# Patient Record
Sex: MALE | Race: OTHER | HISPANIC OR LATINO | Employment: FULL TIME | URBAN - METROPOLITAN AREA
[De-identification: names, ages, dates, MRNs, and addresses within clinical notes are randomized per-mention and may not be internally consistent; named-entity substitution may affect disease eponyms.]

---

## 2018-11-05 ENCOUNTER — HOSPITAL ENCOUNTER (EMERGENCY)
Facility: HOSPITAL | Age: 33
Discharge: HOME | End: 2018-11-05
Attending: EMERGENCY MEDICINE
Payer: OTHER MISCELLANEOUS

## 2018-11-05 VITALS
RESPIRATION RATE: 18 BRPM | OXYGEN SATURATION: 99 % | DIASTOLIC BLOOD PRESSURE: 87 MMHG | TEMPERATURE: 99.3 F | SYSTOLIC BLOOD PRESSURE: 147 MMHG | WEIGHT: 167 LBS | HEART RATE: 98 BPM | BODY MASS INDEX: 26.95 KG/M2

## 2018-11-05 DIAGNOSIS — M62.830 SPASM OF MUSCLE OF LOWER BACK: ICD-10-CM

## 2018-11-05 DIAGNOSIS — M54.50 LOWER BACK PAIN: Primary | ICD-10-CM

## 2018-11-05 PROCEDURE — 96372 THER/PROPH/DIAG INJ SC/IM: CPT

## 2018-11-05 PROCEDURE — 99283 EMERGENCY DEPT VISIT LOW MDM: CPT

## 2018-11-05 RX ORDER — LIDOCAINE 50 MG/G
1 PATCH TOPICAL DAILY
Qty: 30 PATCH | Refills: 0 | Status: SHIPPED | OUTPATIENT
Start: 2018-11-05

## 2018-11-05 RX ORDER — METHOCARBAMOL 500 MG/1
500 TABLET, FILM COATED ORAL
Qty: 12 TABLET | Refills: 0 | Status: SHIPPED | OUTPATIENT
Start: 2018-11-05

## 2018-11-05 RX ORDER — LIDOCAINE 50 MG/G
1 PATCH TOPICAL ONCE
Status: DISCONTINUED | OUTPATIENT
Start: 2018-11-05 | End: 2018-11-05 | Stop reason: HOSPADM

## 2018-11-05 RX ORDER — NAPROXEN 500 MG/1
500 TABLET ORAL EVERY 12 HOURS PRN
Qty: 10 TABLET | Refills: 0 | Status: SHIPPED | OUTPATIENT
Start: 2018-11-05 | End: 2018-11-10

## 2018-11-05 RX ORDER — KETOROLAC TROMETHAMINE 30 MG/ML
30 INJECTION, SOLUTION INTRAMUSCULAR; INTRAVENOUS ONCE
Status: COMPLETED | OUTPATIENT
Start: 2018-11-05 | End: 2018-11-05

## 2018-11-05 RX ADMIN — LIDOCAINE 1 PATCH: 50 PATCH TOPICAL at 15:46

## 2018-11-05 RX ADMIN — KETOROLAC TROMETHAMINE 30 MG: 30 INJECTION, SOLUTION INTRAMUSCULAR at 15:46

## 2018-11-05 NOTE — DISCHARGE INSTRUCTIONS
Dolor bernardino de espalda inferior   LO QUE NECESITA SABER:   El dolor bernardino de la región lumbar de la espalda es rohith molestia repentina en la parte inferior de pickens espalda que dura hasta por 6 semanas  La molestia hace que sea dificil que usted tolere la [place]  INSTRUCCIONES SOBRE [facility]:   Regrese a la ashutosh de emergencias si:   · Usted tiene dolor intenso  · Usted repentinamente tiene rigidez o siente pesadez en ambos glúteos hacia abajo de ambas piernas  · Usted tiene entumecimiento o debilidad en rohith pierna o dolor en ambas piernas  · Usted tiene entumecimiento en el área genital o en la región lumbar  · Usted no puede controlar pickens orina ni jg deposiciones intestinales  Pregúntele a pickens [place] vitaminas y minerales son adecuados para usted  · Usted tiene fiebre  · Usted tiene un dolor por la noche o cuando descansa  · Pickens dolor no mejora con el tratamiento  · Usted tiene dolor que empeora cuando tose o estornuda  · Usted siente un estallido o chasquido repentino en pickens espalda  · Usted tiene preguntas o inquietudes acerca de pickens condición o cuidado  Medicamentos:  Los siguientes medicamentos pueden  ser recetados por pickens médico:  · El acetaminofén  delmy el dolor  Está disponible sin receta médica  Pregunte la cantidad y la frecuencia con que debe tomarlos  [address]  El acetaminofén puede causar daño en el hígado cuando no se leia de forma correcta  · AINEs (Analgésicos antiinflamatorios no esteroides)  ayudan a disminuir la inflamación y el dolor  Stephen medicamento esta disponible con o sin rohith receta médica  Los AINEs pueden causar sangrado estomacal o problemas renales en ciertas personas  Si usted leia un medicamento anticoagulante, siempre pregúntele a pickens médico si los JORY son seguros para usted  Siempre patrica la etiqueta de stephen medicamento y [place] instrucciones      · Un medicamento con receta para el dolor  podrían ser administrados  Pregunte al médico cómo debe karen stephen medicamento de forma flores  · Relajantes musculares  disminuyen el dolor y [employer] músculos de la parte inferior de la [place]  · [place] jg medicamentos ruth se le haya indicado  Consulte con pickens médico si usted emeli que pickens medicamento no le está ayudando o si presenta efectos secundarios  Infórmele si es alérgico a algún medicamento  Mantenga rohith lista actualizada de los [place], las vitaminas y los productos herbales que leia  Incluya los siguientes datos de los medicamentos: cantidad, frecuencia y motivo de administración  Traiga con usted la lista o los envases de la píldoras a jg citas de seguimiento  Lleve la lista de los medicamentos con usted en rashawn de rohith emergencia  Cuidados personales:   · Manténgase activo  lo más que pueda sin causar más dolor  El reposo en cama puede empeorar pickens dolor de espalda  Comience con ejercicios ligeros ruth caminar  Evite levantar objetos hasta que ya no tenga dolor  Solicite más información acerca de las actividades físicas o plan de ejercicios que son los adecuados para usted  · El hielo  ayuda a disminuir la inflamación, el dolor y los espasmos musculares  Ponga hielo fausto en rohith bolsa plástica  Cúbrala con rohith toalla  Aplíquela en pickens teresa lumbar por 20 a 30 minutos cada 2 horas  Huy esto por 2 a 3 días después que el dolor empiece, o según lo indicado  · El calor  ayuda a disminuir dolor y espasmos musculares  Empiece a utilizar calor después de aniya terminado el tratamiento con el hielo  Utilice rohith toalla pequeña empapada con Tribe, rohith almohada térmica o tome un baño de alfredo con agua tibia  Aplíquese calor en el área lesionada lucy 20 a 30 minutos cada 2 horas lucy la cantidad de [employer] indiquen  [place] entre el calor y el hielo  Prevenir el dolor bernardino de la parte inferior de la espalda:   · Use la mecánica corporal adecuada        ¨ Flexione la cadera y las Onnie Vishnu cuando [place]ant[place] Janet a levantar un objeto  No doble la cintura  Utilice los [employer] de las piernas mientras levanta slaetr carga  No use slater espalda  Mantenga el objeto cerca de slater pecho mientras lo levanta  No se tuerza, ni levante cualquier cosa por encima de slater cintura  ¨ Cambie slater posición frecuentemente cuando pase mucho tiempo de pie  Descanse un pie sobre rohith [place] o un reposapiés e intercambie con el otro pie frecuentemente  ¨ No permanezca sentado por lapsos de tiempo prolongados  Cuando sea necesario hacerlo, siéntese en rohith silla de respaldo recto con los pies apoyados en el suelo  Nunca alcance, jale ni empuje mientras se encuentra sentando  · Huy ejercicios que fortalezcan jg músculos de la espalda  Entre en calor antes de hacer ejercicio  Consulte con slater médico sobre [employer] plan de ejercicios para usted  · Mantenga un peso saludable  Consulte con slater médico cuánto debería pesar  Pida que le ayude a crear un plan para bajar de peso si usted tiene sobrepeso  Acuda a jg consultas de control con slater médico según le indicaron  Regrese a rohith alejandro de seguimiento si usted aun tiene [employer] de 1 a 3 semanas de [place]  Puede que usted necesite acudir con un ortopedista si slater dolor de espalda dura más de 12 semanas  Anote jg preguntas para que se acuerde de hacerlas lucy jg visitas  © 2017 [address] Information is for End User's use only and may not be sold, redistributed or otherwise used for commercial purposes  All illustrations and images included in CareNotes® are the copyrighted property of A D A M , Inc  or Cesar Ochoa  Esta información es sólo para uso en educación  Slater intención no es darle un consejo médico sobre enfermedades o tratamientos  Colsulte con slater Corinna Angst farmacéutico antes de seguir cualquier régimen médico para saber si es seguro y efectivo para usted        Espasmo muscular   LO QUE NECESITA SABER:   Un espasmo muscular es rohith contracción convulsiva involuntaria de cualquier músculo o de un shorty de músculos  Un calambre muscular es un espasmo muscular muy doloroso  Los calambres musculares generalmente ocurren después del ejercicio intenso o lucy el [facility]  Estos también pueden ser provocados por ciertos medicamentos, la deshidratación, bajos niveles de calcio o magnesio u otras condiciones de [place]  Ricardo [place] [facility]:   Medicamentos:  Usted podría  necesitar lo siguiente:  · AINEs (Analgésicos antiinflamatorios no esteroides)  pueden disminuir la inflamación y el dolor o la fiebre  Stephen medicamento esta disponible con o sin rohith receta médica  Los AINEs pueden causar sangrado estomacal o problemas renales en ciertas personas  Si usted leia un medicamento anticoagulante, siempre pregúntele a pickens médico si los JORY son seguros para usted  Siempre patrica la etiqueta de stephen medicamento y [place] instrucciones  · [place] jg medicamentos ruth se le haya indicado  Consulte con pickens médico si usted emeli que pickens medicamento no le está ayudando o si presenta efectos secundarios  Infórmele si es alérgico a algún medicamento  Mantenga rohith lista actualizada de los [place], las vitaminas y los productos herbales que leia  Incluya los siguientes datos de los medicamentos: cantidad, frecuencia y motivo de administración  Traiga con usted la lista o los envases de la píldoras a jg citas de seguimiento  Lleve la lista de los medicamentos con usted en rashawn de rohith emergencia  Acuda a jg consultas de control con pickens médico según le indicaron  Usted puede necesitar otros exámenes o tratamientos  También es posible que lo refieran a un fisioterapeuta u otro especialista  Anote jg preguntas para que se acuerde de hacerlas lucy jg visitas  Cuidados personales:   · El estiramiento  de pickens músculo ayuda a aliviar el calambre  También puede ser de [place] el músculo estirado hasta que el calambre desaparezca  · Aplique calor  para ayudar a disminuir el dolor y el espasmo muscular  Aplíquese calor en el área lesionada lucy 20 a 30 minutos cada 2 horas lucy la cantidad de [employer] indiquen  · Aplique hielo  para ayudar a disminuir la inflamación y el dolor  El hielo también puede contribuir a evitar el daño de los tejidos  Use un paquete de hielo o ponga hielo molido dentro de [employer]  Envuelva la compresa o bolsa con rohith toalla y colóquela sobre pickens músculo por 15 a 20 minutos cada hora o ruth se lo indicaron  · Consuma más líquidos  para ayudar a prevenir espasmos musculares causados por la deshidratación  Las bebidas atléticas pueden ayudar a reemplazar los electrolitos que pierde lucy el ejercicio por la sudoración  Pregunte a pickens médico sobre la cantidad de líquido que necesita karen todos los días y cuáles le recomienda  · Consuma alimentos saludables , ruth frutas, verduras, granos integrales, productos lácteos bajos en grasa y proteínas bajas en grasa (carne Pollyann Fernández, legumbres y pescado)  Si está embarazada, pregúntele a pickens médico qué alimentos son ricos en magnesio y [place]  Estos pueden ayudar para [employer] calambres lucy el [place]  · Dé masajes suaves sobre el músculo  para aliviar el calambre  · Respire profundo varias veces  hasta que el calambre se mejore  Acuéstese mientras respira profundo para que no sufra un mareo o desvanecimiento  Pregúntele a pickens Georgette Scott vitaminas y minerales son adecuados para usted  · Usted presenta signos de deshidratación, ruth dolor de [place], [facility] de color amarillo oscuro, ojos o boca secos o latidos cardíacos rápidos  · Usted tiene preguntas o inquietudes acerca de pickens condición o cuidado  Regrese a la ashutosh de emergencias si:   · El músculo con el calambre está caliente al tacto, inflamado o enrojecido  · Usted sufre con frecuencia y sin alivio de calambres musculares en varios músculos diferentes       · Usted presenta calambres musculares con entumecimiento, cosquilleo y sensación quemante en jg julio c y pies  © 2017 [address] Information is for End User's use only and may not be sold, redistributed or otherwise used for commercial purposes  All illustrations and images included in CareNotes® are the copyrighted property of A D A M , Inc  or Cesar Ochoa  Esta información es sólo para uso en educación  Slater intención no es darle un consejo médico sobre enfermedades o tratamientos  Colsulte con slater Carina Antis farmacéutico antes de seguir cualquier régimen médico para saber si es seguro y efectivo para usted

## 2018-11-05 NOTE — ED PROVIDER NOTES
History  Chief Complaint   Patient presents with    Back Pain     lower left sided back pain since lifting something at work 3 days ago pain worsening and radiates down left leg  Denies parasthesia  Piotr Solorio is a 26 y o  male who presents to the ED with complaints of left lower back pain x1 day  Patient states he was lifting heavy objects (approximately 20 30 lbs) on Friday when he felt a sudden onset of localized pain to the left lateral lower back  Patient states that this time the pain lasted approximately 2 hr and went away  Patient states upon awakening this morning he noted increased pain which he describes as tight" with radiation down the posterior aspect of the left leg  Patient states the pain is worse with sitting, bending, and changes in movement  Patient states pain is relieved with lying flat or standing still  Denies numbness, tingling, urinary or bowel incontinence, saddle anesthesia, direct injury, previous back surgery, hematuria, dysuria, urinary urgency, urinary frequency, testicular pain, chest pain, shortness breath, fever, chills  Patient states he took 2 Tylenol this morning without relief  History provided by:  Patient  Back Pain   Location:  Lumbar spine  Quality:  Aching (Tightness)  Radiates to:  L posterior upper leg  Pain severity:  Moderate  Pain is:   Worse during the day  Onset quality:  Gradual  Duration:  3 days  Timing:  Intermittent  Progression:  Waxing and waning  Chronicity:  New  Context: lifting heavy objects    Context: not emotional stress, not falling, not jumping from heights, not MVA, not recent illness, not recent injury and not twisting    Relieved by:  Being still and lying down  Worsened by:  Sitting and twisting  Ineffective treatments:  OTC medications  Associated symptoms: leg pain    Associated symptoms: no abdominal pain, no abdominal swelling, no bladder incontinence, no bowel incontinence, no chest pain, no dysuria, no fever, no headaches, no numbness, no paresthesias, no pelvic pain, no perianal numbness, no tingling, no weakness and no weight loss    Risk factors: no hx of cancer, no hx of osteoporosis, not obese and no vascular disease        None       History reviewed  No pertinent past medical history  Past Surgical History:   Procedure Laterality Date    HEMORROIDECTOMY      NECK SURGERY      done in [place] 2016       History reviewed  No pertinent family history  I have reviewed and agree with the history as documented  Social History   Substance Use Topics    Smoking status: Current Some Day Smoker    Smokeless tobacco: Never Used    Alcohol use No        Review of Systems   Constitutional: Negative for appetite change, chills, fever, unexpected weight change and weight loss  HENT: Negative for congestion, drooling, ear pain, rhinorrhea, sore throat, trouble swallowing and voice change  Eyes: Negative for pain, discharge, redness and visual disturbance  Respiratory: Negative for cough, shortness of breath, wheezing and stridor  Cardiovascular: Negative for chest pain, palpitations and leg swelling  Gastrointestinal: Negative for abdominal pain, blood in stool, bowel incontinence, constipation, diarrhea, nausea and vomiting  Genitourinary: Negative for bladder incontinence, dysuria, flank pain, frequency, hematuria, pelvic pain and urgency  Musculoskeletal: Positive for back pain  Negative for arthralgias, gait problem, joint swelling, myalgias, neck pain and neck stiffness  Skin: Negative for color change and rash  Neurological: Negative for dizziness, tingling, seizures, weakness, light-headedness, numbness, headaches and paresthesias  Physical Exam  Physical Exam   Constitutional: He is oriented to person, place, and time  Vital signs are normal  He appears well-developed and well-nourished  He is cooperative  Non-toxic appearance  No distress  HENT:   Head: Normocephalic and atraumatic  Right Ear: Hearing, tympanic membrane, external ear and ear canal normal    Left Ear: Hearing, tympanic membrane, external ear and ear canal normal    Nose: Nose normal    Mouth/Throat: Uvula is midline, oropharynx is clear and moist and mucous membranes are normal    Eyes: Pupils are equal, round, and reactive to light  Conjunctivae and EOM are normal    Neck: Trachea normal, normal range of motion, full passive range of motion without pain and phonation normal  Neck supple  No spinous process tenderness and no muscular tenderness present  Cardiovascular: Normal rate, regular rhythm, intact distal pulses and normal pulses  Pulmonary/Chest: Effort normal and breath sounds normal  He has no wheezes  He has no rales  Abdominal: Soft  Bowel sounds are normal  There is no tenderness  There is no CVA tenderness  Musculoskeletal:        Lumbar back: He exhibits decreased range of motion, tenderness and spasm  He exhibits no bony tenderness and no pain  Back:    Neck and spine have no noted deformities or signs of inflammation  Spinous processes of lumbar spine palpable, midline, and non-tender  No step-offs  Flexion and side-to-side rotation of lumbar spine causes mild discomfort  No pain with extension  SLR negative b/l  Neurological: He is alert and oriented to person, place, and time  He has normal strength and normal reflexes  No cranial nerve deficit or sensory deficit  GCS eye subscore is 4  GCS verbal subscore is 5  GCS motor subscore is 6  Skin: Skin is warm and dry  Capillary refill takes less than 2 seconds  Nursing note and vitals reviewed        Vital Signs  ED Triage Vitals [11/05/18 1528]   Temperature Pulse Respirations Blood Pressure SpO2   99 3 °F (37 4 °C) 98 18 147/87 99 %      Temp Source Heart Rate Source Patient Position - Orthostatic VS BP Location FiO2 (%)   Temporal -- -- -- --      Pain Score       Worst Possible Pain           Vitals:    11/05/18 1528   BP: 147/87 Pulse: 98       Visual Acuity      ED Medications  Medications   lidocaine (LIDODERM) 5 % patch 1 patch (1 patch Topical Medication Applied 11/5/18 1546)   ketorolac (TORADOL) injection 30 mg (30 mg Intramuscular Given 11/5/18 1546)       Diagnostic Studies  Results Reviewed     None                 No orders to display              Procedures  Procedures       Phone Contacts  ED Phone Contact    ED Course  ED Course as of Nov 05 1709   Mon Nov 05, 2018   1610 Educated patient regarding diagnosis and management  Advised patient to follow up with PCP  Advised patient to RTER for persistent or worsening symptoms  MDM  Number of Diagnoses or Management Options  Lower back pain: new and does not require workup  Spasm of muscle of lower back: new and does not require workup  Diagnosis management comments: Differential diagnosis included but not limited to: Lower back strain, lower back spasm, lumbar herniated disc    Patient Progress  Patient progress: improved    CritCare Time    Disposition  Final diagnoses:   Lower back pain   Spasm of muscle of lower back     Time reflects when diagnosis was documented in both MDM as applicable and the Disposition within this note     Time User Action Codes Description Comment    11/5/2018  3:48 PM Alma Fear Add [M54 5] Lower back pain     11/5/2018  3:48 PM Alma Fear Add [M62 830] Spasm of muscle of lower back       ED Disposition     ED Disposition Condition Comment    Discharge  Angel Pillion discharge to home/self care      Condition at discharge: Good        Follow-up Information     Follow up With Specialties Details Why Contact Info Additional [address] [facility] Emergency Department Emergency Medicine Go to If symptoms worsen [address] [facility]  [phone] [place] ED, [address] , Derek Figueroa, 1936 MD [facility] Schedule an appointment as soon as possible for a visit  [address]  [address] [facility] Schedule an appointment as soon as possible for a visit As needed [place] [phone]  [address], [address] [facility], [place], [place], [id number]          Discharge Medication List as of 11/5/2018  3:50 PM      START taking these medications    Details   lidocaine (LIDODERM) 5 % Apply 1 patch topically daily Remove & Discard patch within 12 hours or as directed by MD, Starting Mon 11/5/2018, Print      methocarbamol (ROBAXIN) 500 mg tablet Take 1 tablet (500 mg total) by mouth daily at bedtime as needed for muscle spasms, Starting Mon 11/5/2018, Print      naproxen (NAPROSYN) 500 mg tablet Take 1 tablet (500 mg total) by mouth every 12 (twelve) hours as needed for mild pain for up to 5 days, Starting Mon 11/5/2018, Until Sat 11/10/2018, Print           No discharge procedures on file      ED Provider  Electronically Signed by           Estefanía Otto PA-C  11/05/18 77

## 2018-11-08 ENCOUNTER — HOSPITAL ENCOUNTER (EMERGENCY)
Facility: HOSPITAL | Age: 33
Discharge: HOME | End: 2018-11-08
Attending: EMERGENCY MEDICINE | Admitting: EMERGENCY MEDICINE
Payer: OTHER MISCELLANEOUS

## 2018-11-08 VITALS
WEIGHT: 171.5 LBS | DIASTOLIC BLOOD PRESSURE: 78 MMHG | SYSTOLIC BLOOD PRESSURE: 130 MMHG | OXYGEN SATURATION: 99 % | RESPIRATION RATE: 16 BRPM | HEART RATE: 94 BPM | BODY MASS INDEX: 27.68 KG/M2 | TEMPERATURE: 98.3 F

## 2018-11-08 DIAGNOSIS — M54.50 LOW BACK PAIN RADIATING TO LEFT LOWER EXTREMITY: Primary | ICD-10-CM

## 2018-11-08 DIAGNOSIS — M79.605 LOW BACK PAIN RADIATING TO LEFT LOWER EXTREMITY: Primary | ICD-10-CM

## 2018-11-08 DIAGNOSIS — M62.830 BACK MUSCLE SPASM: ICD-10-CM

## 2018-11-08 PROCEDURE — 96372 THER/PROPH/DIAG INJ SC/IM: CPT

## 2018-11-08 PROCEDURE — 99283 EMERGENCY DEPT VISIT LOW MDM: CPT

## 2018-11-08 RX ORDER — LIDOCAINE 50 MG/G
1 PATCH TOPICAL ONCE
Status: DISCONTINUED | OUTPATIENT
Start: 2018-11-08 | End: 2018-11-08 | Stop reason: HOSPADM

## 2018-11-08 RX ORDER — DIAZEPAM 5 MG/1
5 TABLET ORAL ONCE
Status: COMPLETED | OUTPATIENT
Start: 2018-11-08 | End: 2018-11-08

## 2018-11-08 RX ORDER — DIAZEPAM 5 MG/1
5 TABLET ORAL EVERY 8 HOURS PRN
Qty: 4 TABLET | Refills: 0 | Status: SHIPPED | OUTPATIENT
Start: 2018-11-08

## 2018-11-08 RX ORDER — KETOROLAC TROMETHAMINE 30 MG/ML
15 INJECTION, SOLUTION INTRAMUSCULAR; INTRAVENOUS ONCE
Status: COMPLETED | OUTPATIENT
Start: 2018-11-08 | End: 2018-11-08

## 2018-11-08 RX ADMIN — DIAZEPAM 5 MG: 5 TABLET ORAL at 12:40

## 2018-11-08 RX ADMIN — KETOROLAC TROMETHAMINE 15 MG: 30 INJECTION, SOLUTION INTRAMUSCULAR at 12:41

## 2018-11-08 RX ADMIN — LIDOCAINE 1 PATCH: 50 PATCH TOPICAL at 12:42

## 2018-11-08 NOTE — DISCHARGE INSTRUCTIONS
Dolor bernardino de espalda inferior   LO QUE NECESITA SABER:   El dolor bernardino de la región lumbar de la espalda es rohith molestia repentina en la parte inferior de pickens espalda que dura hasta por 6 semanas  La molestia hace que sea dificil que usted tolere la [place]  INSTRUCCIONES SOBRE [facility]:   Regrese a la ashutosh de emergencias si:   · Usted tiene dolor intenso  · Usted repentinamente tiene rigidez o siente pesadez en ambos glúteos hacia abajo de ambas piernas  · Usted tiene entumecimiento o debilidad en rohith pierna o dolor en ambas piernas  · Usted tiene entumecimiento en el área genital o en la región lumbar  · Usted no puede controlar pickens orina ni jg deposiciones intestinales  Pregúntele a pickens [place] vitaminas y minerales son adecuados para usted  · Usted tiene fiebre  · Usted tiene un dolor por la noche o cuando descansa  · Pickens dolor no mejora con el tratamiento  · Usted tiene dolor que empeora cuando tose o estornuda  · Usted siente un estallido o chasquido repentino en pickens espalda  · Usted tiene preguntas o inquietudes acerca de pickens condición o cuidado  Medicamentos:  Los siguientes medicamentos pueden  ser recetados por pickens médico:  · El acetaminofén  delmy el dolor  Está disponible sin receta médica  Pregunte la cantidad y la frecuencia con que debe tomarlos  [id number]  El acetaminofén puede causar daño en el hígado cuando no se leia de forma correcta  · AINEs (Analgésicos antiinflamatorios no esteroides)  ayudan a disminuir la inflamación y el dolor  Stephen medicamento esta disponible con o sin rohith receta médica  Los AINEs pueden causar sangrado estomacal o problemas renales en ciertas personas  Si usted leia un medicamento anticoagulante, siempre pregúntele a pickens médico si los JORY son seguros para usted  Siempre patrica la etiqueta de stephen medicamento y [place] instrucciones  · Un medicamento con receta para el dolor  podrían ser Laura Paul  Pregunte al médico cómo debe karen stephen medicamento de forma flores  · Relajantes musculares  disminuyen el dolor y [employer] músculos de la parte inferior de la [place]  · Cambalache jg medicamentos ruth se le haya indicado  Consulte con pickens médico si usted emeli que pickens medicamento no le está ayudando o si presenta efectos secundarios  Infórmele si es alérgico a algún medicamento  Mantenga rohith lista actualizada de los [place], las vitaminas y los productos herbales que leia  Incluya los siguientes datos de los medicamentos: cantidad, frecuencia y motivo de administración  Traiga con usted la lista o los envases de la píldoras a jg citas de seguimiento  Lleve la lista de los medicamentos con usted en rashawn de rohith emergencia  Cuidados personales:   · Manténgase activo  lo más que pueda sin causar más dolor  El reposo en cama puede empeorar pickens dolor de espalda  Comience con ejercicios ligeros ruth caminar  Evite levantar objetos hasta que ya no tenga dolor  Solicite más información acerca de las actividades físicas o plan de ejercicios que son los adecuados para usted  · El hielo  ayuda a disminuir la inflamación, el dolor y los espasmos musculares  Ponga hielo fausto en rohith bolsa plástica  Cúbrala con rohith toalla  Aplíquela en pickens teresa lumbar por 20 a 30 minutos cada 2 horas  Huy esto por 2 a 3 días después que el dolor empiece, o según lo indicado  · El calor  ayuda a disminuir dolor y espasmos musculares  Empiece a utilizar calor después de aniya terminado el tratamiento con el hielo  Utilice rohith toalla pequeña empapada con [place], rohith almohada térmica o tome un baño de alfredo con agua tibia  Aplíquese calor en el área lesionada lucy 20 a 30 minutos cada 2 horas lucy la cantidad de [employer] indiquen  [place] entre el calor y el hielo  Prevenir el dolor bernardino de la parte inferior de la espalda:   · Use la mecánica corporal adecuada        ¨ Flexione la cadera y las rodillas cuando [place] Janet a levantar un objeto  No doble la cintura  Utilice los [employer] de las piernas mientras levanta slater carga  No use slater espalda  Mantenga el objeto cerca de slater pecho mientras lo levanta  No se tuerza, ni levante cualquier cosa por encima de slater cintura  ¨ Cambie slater posición frecuentemente cuando pase mucho tiempo de pie  Descanse un pie sobre rohith [place] o un reposapiés e intercambie con el otro pie frecuentemente  ¨ No permanezca sentado por lapsos de tiempo prolongados  Cuando sea necesario hacerlo, siéntese en rohith silla de respaldo recto con los pies apoyados en el suelo  Nunca alcance, jale ni empuje mientras se encuentra sentando  · Huy ejercicios que fortalezcan jg músculos de la espalda  Entre en calor antes de hacer ejercicio  Consulte con slater médico sobre [employer] plan de ejercicios para usted  · Mantenga un peso saludable  Consulte con slater médico cuánto debería pesar  Pida que le ayude a crear un plan para bajar de peso si usted tiene sobrepeso  Acuda a jg consultas de control con slater médico según le indicaron  Regrese a rohith alejandro de seguimiento si usted aun tiene [employer] de 1 a 3 semanas de [place]  Puede que usted necesite acudir con un ortopedista si slater dolor de espalda dura más de 12 semanas  Anote jg preguntas para que se acuerde de hacerlas lucy jg visitas  © 2017 [address] Information is for End User's use only and may not be sold, redistributed or otherwise used for commercial purposes  All illustrations and images included in CareNotes® are the copyrighted property of A D A M , Inc  or Cesar Ochoa  Esta información es sólo para uso en educación  Slater intención no es darle un consejo médico sobre enfermedades o tratamientos  Colsulte con slater Gala Primer farmacéutico antes de seguir cualquier régimen médico para saber si es seguro y efectivo para usted        Espasmo muscular   LO QUE NECESITA SABER:   Un espasmo muscular es Fani Todd contracción convulsiva involuntaria de cualquier músculo o de un shorty de músculos  Un calambre muscular es un espasmo muscular muy doloroso  Los calambres musculares generalmente ocurren después del ejercicio intenso o lucy el [facility]  Estos también pueden ser provocados por ciertos medicamentos, la deshidratación, bajos niveles de calcio o magnesio u otras condiciones de Quinten Whit[place] [facility]:   Medicamentos:  Usted podría  necesitar lo siguiente:  · AINEs (Analgésicos antiinflamatorios no esteroides)  pueden disminuir la inflamación y el dolor o la fiebre  Yenni medicamento esta disponible con o sin rohith receta médica  Los AINEs pueden causar sangrado estomacal o problemas renales en ciertas personas  Si usted leia un medicamento anticoagulante, siempre pregúntele a pickens médico si los JORY son seguros para usted  Siempre patrica la etiqueta de yenni medicamento y [place] instrucciones  · [place] jg medicamentos ruth se le haya indicado  Consulte con pickens médico si usted emeli que pickens medicamento no le está ayudando o si presenta efectos secundarios  Infórmele si es alérgico a algún medicamento  Mantenga rohith lista actualizada de los [place], las vitaminas y los productos herbales que leia  Incluya los siguientes datos de los medicamentos: cantidad, frecuencia y motivo de administración  Traiga con usted la lista o los envases de la píldoras a jg citas de seguimiento  Lleve la lista de los medicamentos con usted en rashawn de rohith emergencia  Acuda a jg consultas de control con pickens médico según le indicaron  Usted puede necesitar otros exámenes o tratamientos  También es posible que lo refieran a un fisioterapeuta u otro especialista  Anote jg preguntas para que se acuerde de hacerlas lucy jg visitas  Cuidados personales:   · El estiramiento  de pickens músculo ayuda a aliviar el calambre  También puede ser de [place]kt[place]ha [place] el músculo estirado hasta que el calambre desaparezca  · Aplique calor  para ayudar a disminuir el dolor y el espasmo muscular  Aplíquese calor en el área lesionada lucy 20 a 30 minutos cada 2 horas lucy la cantidad de [employer] indiquen  · Aplique hielo  para ayudar a disminuir la inflamación y el dolor  El hielo también puede contribuir a evitar el daño de los tejidos  Use un paquete de hielo o ponga hielo molido dentro de [employer]  Envuelva la compresa o bolsa con rohith toalla y colóquela sobre pickens músculo por 15 a 20 minutos cada hora o ruth se lo indicaron  · Consuma más líquidos  para ayudar a prevenir espasmos musculares causados por la deshidratación  Las bebidas atléticas pueden ayudar a reemplazar los electrolitos que pierde lucy el ejercicio por la sudoración  Pregunte a pickens médico sobre la cantidad de líquido que necesita karen todos los días y cuáles le recomienda  · Consuma alimentos saludables , ruth frutas, verduras, granos integrales, productos lácteos bajos en grasa y proteínas bajas en grasa (carne [place], legumbres y pescado)  Si está embarazada, pregúntele a pickens médico qué alimentos son ricos en magnesio y [place]  Estos pueden ayudar para [employer] calambres lucy el [place]  · Dé masajes suaves sobre el músculo  para aliviar el calambre  · Respire profundo varias veces  hasta que el calambre se mejore  Acuéstese mientras respira profundo para que no sufra un mareo o desvanecimiento  Pregúntele a pickens Hans Banker vitaminas y minerales son adecuados para usted  · Usted presenta signos de deshidratación, urth dolor de [place], [facility] de color amarillo oscuro, ojos o boca secos o latidos cardíacos rápidos  · Usted tiene preguntas o inquietudes acerca de pickens condición o cuidado  Regrese a la ashutosh de emergencias si:   · El músculo con el calambre está caliente al tacto, inflamado o enrojecido  · Usted sufre con frecuencia y sin alivio de calambres musculares en varios músculos diferentes       · Usted presenta calambres musculares con entumecimiento, cosquilleo y sensación quemante en jg julio c y pies  © 2017 [address]one Information is for End User's use only and may not be sold, redistributed or otherwise used for commercial purposes  All illustrations and images included in CareNotes® are the copyrighted property of A D A M , Inc  or Cesar Ochoa  Esta información es sólo para uso en educación  Slater intención no es darle un consejo médico sobre enfermedades o tratamientos  Colsulte con slater Mele Guevara farmacéutico antes de seguir cualquier régimen médico para saber si es seguro y efectivo para usted

## 2018-11-08 NOTE — ED PROVIDER NOTES
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 when he felt a pull in his left lower back  He states he continued working did not have much pain  He states the pain started that following day mildly and has been gradually worsening  He states that he was here on 11/05/2018 for the same thing  He was diagnosed with a muscle spasm and given prescriptions for Lidoderm patch, Robaxin and naproxen  He states he was unable to fill the Lidoderm patch because he does not have insurance and it was too expensive  He has been taking the Robaxin and naproxen without relief  He states he still feels a tight spasming pain in the left lower back that now can radiate into his left thigh  He states the pain can be sharp and like a pins and needle sensation in his left leg  Pain is worse with movement and bending  Pain is improved with lying flat or being still  He did let his work note when he has an appointment with worker's Comp in 4 days  He denies any new injury or trauma  He denies any history of back problems or fracture/surgery  He denies any history of cancer or IV drug use  Denies fever, chills, nausea vomiting diarrhea, chest pain, shortness breath, abdominal pain, dysuria, frequency, hematuria, testicular pain, saddle anesthesia, numbness or weakness, bladder or bowel dysfunction  Prior to Admission Medications   Prescriptions Last Dose Informant Patient Reported?  Taking?   lidocaine (LIDODERM) 5 %   No Yes   Sig: Apply 1 patch topically daily Remove & Discard patch within 12 hours or as directed by MD   methocarbamol (ROBAXIN) 500 mg tablet   No Yes   Sig: Take 1 tablet (500 mg total) by mouth daily at bedtime as needed for muscle spasms   naproxen (NAPROSYN) 500 mg tablet   No Yes   Sig: Take 1 tablet (500 mg total) by mouth every 12 (twelve) hours as needed for mild pain for up to 5 days      Facility-Administered Medications: None       History reviewed  No pertinent past medical history  Past Surgical History:   Procedure Laterality Date    HEMORROIDECTOMY      NECK SURGERY      done in [place] 2016       History reviewed  No pertinent family history  I have reviewed and agree with the history as documented  Social History   Substance Use Topics    Smoking status: Current Some Day Smoker     Types: Cigarettes    Smokeless tobacco: Never Used    Alcohol use No        Review of Systems   Constitutional: Negative for chills and fever  Respiratory: Negative for shortness of breath  Cardiovascular: Negative for chest pain  Gastrointestinal: Negative for abdominal pain, diarrhea, nausea and vomiting  Genitourinary: Negative for dysuria, frequency, hematuria and testicular pain  Musculoskeletal: Positive for back pain  Negative for neck pain  Skin: Negative for rash  Neurological: Negative for weakness and numbness  All other systems reviewed and are negative  Physical Exam  Physical Exam   Constitutional: He is oriented to person, place, and time  He appears well-developed and well-nourished  No distress  HENT:   Head: Normocephalic and atraumatic  Right Ear: External ear normal    Left Ear: External ear normal    Nose: Nose normal    Eyes: Conjunctivae and EOM are normal    Neck: Normal range of motion  Cardiovascular: Normal rate, regular rhythm and normal heart sounds  Exam reveals no gallop and no friction rub  No murmur heard  Pulmonary/Chest: Effort normal and breath sounds normal  No respiratory distress  He has no wheezes  He has no rales  Abdominal: Soft  Bowel sounds are normal  He exhibits no distension  There is no tenderness  There is no guarding  Musculoskeletal:        Arms:  Reproducible tenderness palpation over the left lower back muscles and left SI joint  There is a palpable muscle spasm to the left lower back  There is no midline spinous process tenderness to palpation  No deformity or step-off  No erythema, warmth, ecchymosis, abrasion or rash  Patient notes pain to the left lower back and into left thigh during left straight leg raise around 75°  Patellar DTR intact bilaterally  Extensor hallicus longus intact bilaterally  No foot drop  Able to move from lying to sitting to standing with discomfort  Neurovascularly intact distally  Pedal pulses intact  Neurological: He is alert and oriented to person, place, and time  Skin: Skin is warm and dry  He is not diaphoretic  No erythema  Psychiatric: He has a normal mood and affect  His behavior is normal    Nursing note and vitals reviewed        Vital Signs  ED Triage Vitals [11/08/18 1213]   Temperature Pulse Respirations Blood Pressure SpO2   98 3 °F (36 8 °C) 94 16 130/78 99 %      Temp Source Heart Rate Source Patient Position - Orthostatic VS BP Location FiO2 (%)   Oral -- Sitting Left arm --      Pain Score       Worst Possible Pain           Vitals:    11/08/18 1213   BP: 130/78   Pulse: 94   Patient Position - Orthostatic VS: Sitting       Visual Acuity  Visual Acuity      Most Recent Value   L Pupil Size (mm)  3   R Pupil Size (mm)  3          ED Medications  Medications   diazepam (VALIUM) tablet 5 mg (5 mg Oral Given 11/8/18 1240)   ketorolac (TORADOL) injection 15 mg (15 mg Intramuscular Given 11/8/18 1241)       Diagnostic Studies  Results Reviewed     None                 No orders to display              Procedures  Procedures       Phone Contacts  ED Phone Contact    ED Course                               MDM  Number of Diagnoses or Management Options  Back muscle spasm:   Low back pain radiating to left lower extremity:   Diagnosis management comments: Patient with back pain after lifting a heavy object at work  He was seen here 3 days he for same and discharged with naproxen and Robaxin  He could not fill the Lidoderm patch due to insurance issues  He has been taking the other medications as prescribed without relief  He has not taken any medications today  No new injury or trauma  He states pain has not been relieved by the medications prescribed  No red flag symptoms  Plan will be to apply a Lidoderm patch, give Toradol and Valium here as he has a ride home  Patient needs much improvement after the medications given here  He is up moving around in able to flex extend, rotate with very minimal discomfort  Will give a short course of Valium as muscle relaxer for home  He was looked up on the [facility] aware drug monitoring system with no recent prescriptions found  Explained he should not work or drive while taking Valium and also not to drink alcohol or take with other sedating medications/drugs  Instructed to follow up with his work/worker's Comp agency in doctor as scheduled  Return to the ED if symptoms worsen or new symptoms arise  Patient states understanding and agrees with plan  CritCare Time    Disposition  Final diagnoses:   Low back pain radiating to left lower extremity   Back muscle spasm     Time reflects when diagnosis was documented in both MDM as applicable and the Disposition within this note     Time User Action Codes Description Comment    11/8/2018 12:56 PM Óscar Ulloa Add [M54 5, D37 60] Low back pain radiating to left lower extremity     11/8/2018 12:56 PM Óscar Ulloa Add [D76 587] Back muscle spasm       ED Disposition     ED Disposition Condition Comment    Discharge  Chrissy Stevens discharge to home/self care      Condition at discharge: Good        Follow-up Information     Follow up With Specialties Details Why Contact Info Additional Via Iza Byrd MD Internal Medicine Schedule an appointment as soon as possible for a visit in 1 day  [address] [facility]  [phone]       With your work/worker's comp doctor- inform them of your ED visit  [address] [facility] Emergency Medicine  If symptoms worsen or new symptoms arise as discussed  [facility]  [phone] [address] [place] [address] ED, [address] [facility] [address]  , [place], [place], [address]          Discharge Medication List as of 11/8/2018  1:23 PM      START taking these medications    Details   diazepam (VALIUM) 5 mg tablet Take 1 tablet (5 mg total) by mouth every 8 (eight) hours as needed for muscle spasms, Starting Thu 11/8/2018, Print         CONTINUE these medications which have NOT CHANGED    Details   lidocaine (LIDODERM) 5 % Apply 1 patch topically daily Remove & Discard patch within 12 hours or as directed by MD, Starting Mon 11/5/2018, Print      methocarbamol (ROBAXIN) 500 mg tablet Take 1 tablet (500 mg total) by mouth daily at bedtime as needed for muscle spasms, Starting Mon 11/5/2018, Print      naproxen (NAPROSYN) 500 mg tablet Take 1 tablet (500 mg total) by mouth every 12 (twelve) hours as needed for mild pain for up to 5 days, Starting Mon 11/5/2018, Until Sat 11/10/2018, Print           No discharge procedures on file      ED Provider  Electronically Signed by           Pooja Manzanares PA-C  11/09/18 6211

## 2020-02-26 ENCOUNTER — APPOINTMENT (EMERGENCY)
Dept: RADIOLOGY | Facility: HOSPITAL | Age: 35
End: 2020-02-26

## 2020-02-26 ENCOUNTER — HOSPITAL ENCOUNTER (EMERGENCY)
Facility: HOSPITAL | Age: 35
Discharge: HOME | End: 2020-02-26
Attending: EMERGENCY MEDICINE | Admitting: EMERGENCY MEDICINE

## 2020-02-26 VITALS
SYSTOLIC BLOOD PRESSURE: 135 MMHG | RESPIRATION RATE: 20 BRPM | BODY MASS INDEX: 27.44 KG/M2 | DIASTOLIC BLOOD PRESSURE: 70 MMHG | WEIGHT: 170 LBS | HEART RATE: 77 BPM | OXYGEN SATURATION: 99 % | TEMPERATURE: 98 F

## 2020-02-26 DIAGNOSIS — M54.6 THORACIC BACK PAIN: Primary | ICD-10-CM

## 2020-02-26 LAB
BILIRUB UR QL STRIP: NEGATIVE
CLARITY UR: CLEAR
COLOR UR: YELLOW
GLUCOSE UR STRIP-MCNC: NEGATIVE MG/DL
HGB UR QL STRIP.AUTO: NEGATIVE
KETONES UR STRIP-MCNC: NEGATIVE MG/DL
LEUKOCYTE ESTERASE UR QL STRIP: NEGATIVE
NITRITE UR QL STRIP: NEGATIVE
PH UR STRIP.AUTO: 7.5 [PH] (ref 4.5–8)
PROT UR STRIP-MCNC: NEGATIVE MG/DL
SP GR UR STRIP.AUTO: 1.02 (ref 1–1.03)
UROBILINOGEN UR QL STRIP.AUTO: 0.2 E.U./DL

## 2020-02-26 PROCEDURE — 74176 CT ABD & PELVIS W/O CONTRAST: CPT

## 2020-02-26 PROCEDURE — 99284 EMERGENCY DEPT VISIT MOD MDM: CPT | Performed by: PHYSICIAN ASSISTANT

## 2020-02-26 PROCEDURE — 96372 THER/PROPH/DIAG INJ SC/IM: CPT

## 2020-02-26 PROCEDURE — 99284 EMERGENCY DEPT VISIT MOD MDM: CPT

## 2020-02-26 PROCEDURE — 81003 URINALYSIS AUTO W/O SCOPE: CPT

## 2020-02-26 RX ORDER — NAPROXEN 500 MG/1
500 TABLET ORAL 2 TIMES DAILY WITH MEALS
Qty: 14 TABLET | Refills: 0 | Status: SHIPPED | OUTPATIENT
Start: 2020-02-26 | End: 2020-03-04

## 2020-02-26 RX ORDER — METHOCARBAMOL 750 MG/1
750 TABLET, FILM COATED ORAL 3 TIMES DAILY
Qty: 9 TABLET | Refills: 0 | Status: SHIPPED | OUTPATIENT
Start: 2020-02-26 | End: 2020-02-29

## 2020-02-26 RX ORDER — KETOROLAC TROMETHAMINE 30 MG/ML
30 INJECTION, SOLUTION INTRAMUSCULAR; INTRAVENOUS ONCE
Status: COMPLETED | OUTPATIENT
Start: 2020-02-26 | End: 2020-02-26

## 2020-02-26 RX ADMIN — KETOROLAC TROMETHAMINE 30 MG: 30 INJECTION, SOLUTION INTRAMUSCULAR at 14:34

## 2020-02-26 NOTE — ED PROVIDER NOTES
History  Chief Complaint   Patient presents with    Back Pain     mid-right back pain x 2 days  pt denies injury     25-year-old male presents emergency room for evaluation of right mid back pain  Onset 2 days ago  States it came on all of the sudden  Pain feels like it is inside  Denies injury or new lifting  Denies history of this type of pain in the past   Denies fever, nausea or vomiting  Denies changes in urination  Denies rash  Pain does not radiate into the abdomen or chest   Denies shortness of breath or cough  Pain does wax and wane  Pain is also sometimes worse with movement  States he has difficulty getting comfortable on occasion  Denies bowel or bladder dysfunction, denies saddle anesthesia  Patient is a , supervisor  History provided by:  Patient      Prior to Admission Medications   Prescriptions Last Dose Informant Patient Reported? Taking?   diazepam (VALIUM) 5 mg tablet Not Taking at Unknown time  No No   Sig: Take 1 tablet (5 mg total) by mouth every 8 (eight) hours as needed for muscle spasms   Patient not taking: Reported on 2/26/2020   lidocaine (LIDODERM) 5 % Not Taking at Unknown time  No No   Sig: Apply 1 patch topically daily Remove & Discard patch within 12 hours or as directed by MD   Patient not taking: Reported on 2/26/2020   methocarbamol (ROBAXIN) 500 mg tablet Not Taking at Unknown time  No No   Sig: Take 1 tablet (500 mg total) by mouth daily at bedtime as needed for muscle spasms   Patient not taking: Reported on 2/26/2020   naproxen (NAPROSYN) 500 mg tablet   No No   Sig: Take 1 tablet (500 mg total) by mouth every 12 (twelve) hours as needed for mild pain for up to 5 days      Facility-Administered Medications: None       History reviewed  No pertinent past medical history  Past Surgical History:   Procedure Laterality Date    HEMORROIDECTOMY      NECK SURGERY      done in  2016       History reviewed  No pertinent family history    I have reviewed and agree with the history as documented  E-Cigarette/Vaping     E-Cigarette/Vaping Substances     Social History     Tobacco Use    Smoking status: Current Some Day Smoker     Types: Cigarettes    Smokeless tobacco: Never Used   Substance Use Topics    Alcohol use: No    Drug use: No       Review of Systems   Constitutional: Negative for chills and fever  Respiratory: Negative for shortness of breath  Cardiovascular: Negative for chest pain  Musculoskeletal: Positive for back pain  Skin: Negative for rash  Physical Exam  Physical Exam   Constitutional: He appears well-developed and well-nourished  HENT:   Right Ear: External ear normal    Left Ear: External ear normal    Eyes: Conjunctivae are normal    Neck: Neck supple  Cardiovascular: Normal rate, regular rhythm and normal heart sounds  Pulmonary/Chest: Effort normal and breath sounds normal    Abdominal: Soft  Bowel sounds are normal  He exhibits no distension  There is no tenderness  There is CVA tenderness (right)  Musculoskeletal:        Thoracic back: He exhibits tenderness and pain  He exhibits normal range of motion, no bony tenderness, no swelling, no edema and no deformity  Back:    Negative straight leg raise  No foot drop  Able to walk     Neurological: He is alert  Reflex Scores:       Patellar reflexes are 2+ on the right side and 2+ on the left side  Skin: Skin is warm and dry  No rash noted  Psychiatric: He has a normal mood and affect  Nursing note and vitals reviewed        Vital Signs  ED Triage Vitals [02/26/20 1356]   Temperature Pulse Respirations Blood Pressure SpO2   98 °F (36 7 °C) 80 18 143/73 100 %      Temp Source Heart Rate Source Patient Position - Orthostatic VS BP Location FiO2 (%)   Oral Monitor Sitting Right arm --      Pain Score       6           Vitals:    02/26/20 1356 02/26/20 1627   BP: 143/73 135/70   Pulse: 80 77   Patient Position - Orthostatic VS: Sitting Sitting Visual Acuity      ED Medications  Medications   ketorolac (TORADOL) injection 30 mg (30 mg Intramuscular Given 2/26/20 1434)       Diagnostic Studies  Results Reviewed     Procedure Component Value Units Date/Time    POCT urinalysis dipstick [[id number]]  (Normal) Resulted:  02/26/20 1443    Lab Status:  Final result Specimen:  Urine Updated:  02/26/20 1443     Clarity, UA --    Urine Macroscopic, POC [[id number]] Collected:  02/26/20 1446    Lab Status:  Final result Specimen:  Urine Updated:  02/26/20 1442     Color, UA Yellow     Clarity, UA Clear     pH, UA 7 5     Leukocytes, UA Negative     Nitrite, UA Negative     Protein, UA Negative mg/dl      Glucose, UA Negative mg/dl      Ketones, UA Negative mg/dl      Urobilinogen, UA 0 2 E U /dl      Bilirubin, UA Negative     Blood, UA Negative     Specific Gravity, UA 1 020    Narrative:       CLINITEK RESULT                 CT renal stone study abdomen pelvis without contrast   Final Result by Krystal Abrams MD (02/26 1538)      No urinary calculi identified  Large volume of right-sided colonic stool               Workstation performed: [id number]                    Procedures  Procedures         ED Course                               MDM  Number of Diagnoses or Management Options  Thoracic back pain:      Amount and/or Complexity of Data Reviewed  Clinical lab tests: ordered and reviewed  Tests in the radiology section of CPT®: ordered and reviewed    Risk of Complications, Morbidity, and/or Mortality  General comments: Differential diagnosis includes but is not limited to:  Muscle spasm, strain, kidney stone    Reviewed results with patient and discussed supportive measures for back pain and follow-up with Spine program      Patient Progress  Patient progress: stable        Disposition  Final diagnoses:   Thoracic back pain     Time reflects when diagnosis was documented in both MDM as applicable and the Disposition within this note     Time User Action Codes Description Comment    2/26/2020  4:24 PM Amrik Randolph Add [M54 6] Thoracic back pain       ED Disposition     ED Disposition Condition Date/Time Comment    Discharge Stable Wed Feb 26, 2020  4:24 PM Christo Kendall discharge to home/self care  Follow-up Information     Follow up With Specialties Details Why Contact Info Additional Information    [facility] Physical Therapy In 2 days  [phone] [facility] [place] [address] Emergency Department Emergency Medicine  If symptoms worsen [address] [place] [address] ED, [address] [facility], [facility], [address] [facility], [address]   [phone]          Discharge Medication List as of 2/26/2020  4:26 PM      START taking these medications    Details   !! methocarbamol (ROBAXIN) 750 mg tablet Take 1 tablet (750 mg total) by mouth 3 (three) times a day for 3 days, Starting Wed 2/26/2020, Until Sat 2/29/2020, Normal       !! - Potential duplicate medications found  Please discuss with provider  CONTINUE these medications which have CHANGED    Details   naproxen (NAPROSYN) 500 mg tablet Take 1 tablet (500 mg total) by mouth 2 (two) times a day with meals for 7 days, Starting Wed 2/26/2020, Until Wed 3/4/2020, Normal         CONTINUE these medications which have NOT CHANGED    Details   diazepam (VALIUM) 5 mg tablet Take 1 tablet (5 mg total) by mouth every 8 (eight) hours as needed for muscle spasms, Starting Thu 11/8/2018, Print      lidocaine (LIDODERM) 5 % Apply 1 patch topically daily Remove & Discard patch within 12 hours or as directed by MD, Starting Mon 11/5/2018, Print      !! methocarbamol (ROBAXIN) 500 mg tablet Take 1 tablet (500 mg total) by mouth daily at bedtime as needed for muscle spasms, Starting Mon 11/5/2018, Print       !! - Potential duplicate medications found  Please discuss with provider  No discharge procedures on file      PDMP Review     None          ED Provider  Electronically Signed by           Chitra Head PA-C  02/26/20 8993

## 2020-02-26 NOTE — DISCHARGE INSTRUCTIONS
Dolor de espalda   LO QUE NECESITA SABER:   El dolor de espalda es común  Puede ser causado por rohith gran cantidad de afecciones, ruth la artritis o por el deterioro de los discos de la columna vertebral  El riesgo del dolor de espalda aumenta al sufrir lesiones, por falta de [place], o inclinarse hacia adelante o girar de un lado a otro de forma repetitiva  Usted puede estar adolorido o sentir rigidez en carmen o ambos lados de la espalda  El dolor se puede propagar a jg glúteos o muslos  INSTRUCCIONES SOBRE EL MARILY HOSPITALARIA:   Medicamentos:   · AINEs (Analgésicos antiinflamatorios no esteroides)  ayudan a disminuir la inflamación y el dolor  Stephen medicamento esta disponible con o sin rohith receta médica  Los AINEs pueden causar sangrado estomacal o problemas renales en ciertas personas  Si usted leia un medicamento anticoagulante, siempre pregúntele a pickens médico si los JORY son seguros para usted  Siempre patrica la etiqueta de stephen medicamento y [place] instrucciones  · El acetaminofén  [employer]  Está disponible sin receta médica  Pregunte la cantidad y la frecuencia con que debe tomarlos  [phone]  El acetaminofén puede causar daño en el hígado cuando no se leia de forma correcta  · Un medicamento con receta para el dolor  podrían ser Marti Peters  Pregunte al médico cómo debe karen stephen medicamento de forma flores  · [place] jg medicamentos ruth se le haya indicado  Consulte con pickens médico si usted emeli que pickens medicamento no le está ayudando o si presenta efectos secundarios  Infórmele si es alérgico a cualquier medicamento  Mantenga rohith lista actualizada de los [place], las vitaminas y los productos herbales que leia  Incluya los siguientes datos de los medicamentos: cantidad, frecuencia y motivo de administración  Traiga con usted la lista o los envases de la píldoras a jg citas de seguimiento   Lleve la lista de los medicamentos con usted en rashawn de [place] emergencia  Acuda en 2 semanas a pickens alejandro de control con pickens médico, o según le indicaron:  Anote jg preguntas para que se acuerde de hacerlas lucy jg visitas  La forma de controlar pickens dolor de espalda:   · Aplique hielo  en pickens espalda o en el área afectada de 15 a 20 minutos cada hora o según le indicaron  Use un paquete de hielo o ponga hielo molido dentro de [employer]  Cúbrala con rohith toalla  El hielo disminuye el dolor y ayuda a evitar el daño en los tejidos  · La aplicación de calor  en pickens espalda o en el área afectada de 20 a 30 minutos cada 2 horas lucy los días que le indicaron  El calor ayuda a disminuir el dolor y los espasmos musculares  · Manténgase activo  lo más que pueda sin causar [employer]  El reposo en cama puede empeorar pickens dolor de espalda  Evite levantar objetos hasta que ya no tenga dolor  Regrese a la ashutosh de emergencias si:   · Usted tiene dolor, entumecimiento o debilidad en rohith o en ambas piernas  · El dolor se vuelve tan intenso que lo incapacita para caminar  · Usted no puede controlar pickens orina ni jg deposiciones intestinales  · Usted tiene dolor de espalda intenso con dolor en el pecho  · Usted tiene dolor de espalda intenso, náuseas y vómito  · Usted tiene un dolor de espalda intenso que se propaga a un costado o al área genital   Delrae Pion a pickens Arnold Sprinkle vitaminas y minerales son adecuados para usted  · Usted tiene dolor de espalda que no mejora con el reposo, ni con el medicamento para el dolor  · Usted tiene fiebre  · Usted tiene un dolor que empeora cuando está acostado boca Evin Amble o al descansar  · Usted tiene dolor que empeora cuando tose o estornuda  · Usted pierde peso sin proponérselo  · Usted tiene preguntas o inquietudes acerca de pickens condición o cuidado  © 2017 [address] Information is for End User's use only and may not be sold, redistributed or otherwise used for commercial purposes   All illustrations and images included in CareNotes® are the copyrighted property of A D A M , Inc  or Cesar Ochoa  Esta información es sólo para uso en educación  Pickens intención no es darle un consejo médico sobre enfermedades o tratamientos  Colsulte con pickens [place] farmacéutico antes de seguir cualquier régimen médico para saber si es seguro y efectivo para usted